# Patient Record
Sex: MALE | Race: BLACK OR AFRICAN AMERICAN | NOT HISPANIC OR LATINO | Employment: FULL TIME | URBAN - METROPOLITAN AREA
[De-identification: names, ages, dates, MRNs, and addresses within clinical notes are randomized per-mention and may not be internally consistent; named-entity substitution may affect disease eponyms.]

---

## 2024-01-02 ENCOUNTER — TELEPHONE (OUTPATIENT)
Dept: GASTROENTEROLOGY | Facility: CLINIC | Age: 54
End: 2024-01-02
Payer: OTHER GOVERNMENT

## 2024-01-02 DIAGNOSIS — K62.5 HEMORRHAGE OF ANUS AND RECTUM: Primary | ICD-10-CM

## 2024-01-02 NOTE — TELEPHONE ENCOUNTER
----- Message from Gabby Morillo LPN sent at 12/26/2023 11:08 AM CST -----  Contact: April([facility])    ----- Message -----  From: Concepcion Badillo  Sent: 12/26/2023  11:00 AM CST  To: Anastacio CHRIS Staff    April called to consult with nurse or staff regarding a referral sent in for the patient. She wanted to check on the status of the referral and would like a call back. She left a call back number of [phone] and states to ask for anyone on purple team. Thanks/MR

## 2024-01-07 ENCOUNTER — TELEPHONE (OUTPATIENT)
Dept: GASTROENTEROLOGY | Facility: CLINIC | Age: 54
End: 2024-01-07
Payer: OTHER GOVERNMENT

## 2024-01-07 NOTE — TELEPHONE ENCOUNTER
Referral reviewed. No gMed chart. Blood in stool. A1c 11. Eval for colonoscopy.    Okay to schedule next available OV with NP.  NBP

## 2024-01-08 NOTE — TELEPHONE ENCOUNTER
----- Message from Sarbjit Salcido sent at 1/8/2024  8:13 AM CST -----  Contact: pt  Pt is calling rg [facility] an [facility] Chaves/ following up on that referral being sent and can be reached at [phone]/ when will the office be scheduling again for appt/thanks/sabina

## 2024-01-09 ENCOUNTER — OFFICE VISIT (OUTPATIENT)
Dept: GASTROENTEROLOGY | Facility: CLINIC | Age: 54
End: 2024-01-09
Payer: OTHER GOVERNMENT

## 2024-01-09 VITALS
HEIGHT: 68 IN | BODY MASS INDEX: 38.49 KG/M2 | DIASTOLIC BLOOD PRESSURE: 86 MMHG | OXYGEN SATURATION: 97 % | WEIGHT: 254 LBS | HEART RATE: 83 BPM | SYSTOLIC BLOOD PRESSURE: 138 MMHG

## 2024-01-09 DIAGNOSIS — K62.5 HEMORRHAGE OF ANUS AND RECTUM: ICD-10-CM

## 2024-01-09 DIAGNOSIS — K92.1 HEMATOCHEZIA: Primary | ICD-10-CM

## 2024-01-09 DIAGNOSIS — Z12.11 SCREENING FOR COLON CANCER: ICD-10-CM

## 2024-01-09 PROCEDURE — 99203 OFFICE O/P NEW LOW 30 MIN: CPT | Mod: S$GLB,,,

## 2024-01-09 RX ORDER — POLYETHYLENE GLYCOL 3350, SODIUM SULFATE, POTASSIUM CHLORIDE, MAGNESIUM SULFATE, AND SODIUM CHLORIDE FOR ORAL SOLUTION 178.7-7.3G
1 KIT ORAL ONCE
Qty: 1 EACH | Refills: 0 | Status: SHIPPED | OUTPATIENT
Start: 2024-01-09 | End: 2024-01-09

## 2024-01-09 RX ORDER — POLYETHYLENE GLYCOL 3350, SODIUM SULFATE, POTASSIUM CHLORIDE, MAGNESIUM SULFATE, AND SODIUM CHLORIDE FOR ORAL SOLUTION 178.7-7.3G
1 KIT ORAL ONCE
Qty: 1 EACH | Refills: 0 | Status: CANCELLED | OUTPATIENT
Start: 2024-01-09 | End: 2024-01-09

## 2024-01-09 RX ORDER — ROSUVASTATIN CALCIUM 20 MG/1
20 TABLET, COATED ORAL DAILY
COMMUNITY

## 2024-01-09 RX ORDER — GLIPIZIDE 10 MG/1
5 TABLET, FILM COATED, EXTENDED RELEASE ORAL
COMMUNITY

## 2024-01-09 RX ORDER — GABAPENTIN 100 MG/1
100 CAPSULE ORAL 3 TIMES DAILY
COMMUNITY

## 2024-01-09 RX ORDER — METFORMIN HYDROCHLORIDE 500 MG/1
500 TABLET ORAL 2 TIMES DAILY WITH MEALS
COMMUNITY

## 2024-01-09 NOTE — PROGRESS NOTES
Clinic Note    Reason for visit:  The primary encounter diagnosis was Hematochezia. Diagnoses of Hemorrhage of anus and rectum and Screening for colon cancer were also pertinent to this visit.    PCP: Donna Garcia   No address on file    HPI:  This is a 53 y.o. male who is here to establish care. Patient referred due to blood in stool. Patient states for the past few months he has had intermittent BRB with wiping. No blood in the stool. No dark stools. Denies constipation, diarrhea, heartburn, or abdominal pain. Has daily easy to pass BMs. No prior colonoscopy. His brother has Crohn's disease. No FH of colon cancer.     8/31/2023: Hgb 13.8L, A1C 11.1H, Cr 1.1, CMP wnl  3/2023: B12/Folate wnl, Hgb 14, MCV 77.5L, CBC onl, Cr 1.0, glucose 478H    Review of Systems   Constitutional:  Negative for diaphoresis, fatigue, fever and unexpected weight change.   HENT:  Negative for hearing loss, mouth sores, postnasal drip, sore throat and trouble swallowing.    Eyes:  Negative for pain, discharge and eye dryness.   Respiratory:  Negative for apnea, cough, choking, chest tightness, shortness of breath and wheezing.    Cardiovascular:  Negative for chest pain, palpitations and leg swelling.   Gastrointestinal:  Positive for anal bleeding. Negative for abdominal distention, abdominal pain, blood in stool, change in bowel habit, constipation, diarrhea, nausea, rectal pain, vomiting, reflux and fecal incontinence.   Genitourinary:  Negative for bladder incontinence, dysuria and hematuria.   Musculoskeletal:  Negative for arthralgias, back pain and joint swelling.   Integumentary:  Negative for color change and rash.   Allergic/Immunologic: Negative for environmental allergies and food allergies.   Neurological:  Negative for seizures and headaches.   Hematological:  Negative for adenopathy. Does not bruise/bleed easily.        Past Medical History:   Diagnosis Date    Depression     Mixed hyperlipidemia     Neuropathy      "Obesity, Class II, BMI 35-39.9, isolated (see actual BMI)     Sleep apnea, unspecified     no CPAP use    Type 2 diabetes mellitus without complications      Past Surgical History:   Procedure Laterality Date    APPENDECTOMY      before 2000     Family History   Problem Relation Age of Onset    Crohn's disease Brother         age of onset unknown    Colon cancer Neg Hx     Esophageal cancer Neg Hx     Liver disease Neg Hx     Rectal cancer Neg Hx     Liver cancer Neg Hx     Stomach cancer Neg Hx     Ulcerative colitis Neg Hx     Pancreatic cancer Neg Hx      Social History     Tobacco Use    Smoking status: Never    Smokeless tobacco: Never   Substance Use Topics    Alcohol use: Never    Drug use: Never     Review of patient's allergies indicates:  No Known Allergies   Medication List with Changes/Refills   New Medications    SUFLAVE 178.7-7.3-0.5 GRAM SOLR    Take 1 kit by mouth once. Take according to package instructions with indicated amount of water. No breakfast day before test. for 1 dose   Current Medications    GABAPENTIN (NEURONTIN) 100 MG CAPSULE    Take 100 mg by mouth 3 (three) times daily.    GLIPIZIDE (GLUCOTROL) 10 MG TR24    Take 5 mg by mouth daily with breakfast.    METFORMIN (GLUCOPHAGE) 500 MG TABLET    Take 500 mg by mouth 2 (two) times daily with meals.    ROSUVASTATIN (CRESTOR) 20 MG TABLET    Take 20 mg by mouth once daily.         Vital Signs:  /86 (BP Location: Left arm, Patient Position: Sitting, BP Method: Large (Automatic))   Pulse 83   Ht 5' 8" (1.727 m)   Wt 115.2 kg (254 lb)   SpO2 97%   BMI 38.62 kg/m²        Physical Exam  Constitutional:       General: He is not in acute distress.     Appearance: Normal appearance. He is well-developed. He is not ill-appearing or toxic-appearing.   HENT:      Head: Normocephalic and atraumatic.      Nose: Nose normal.      Mouth/Throat:      Mouth: Mucous membranes are moist.      Pharynx: Oropharynx is clear. No oropharyngeal exudate " or posterior oropharyngeal erythema.   Eyes:      General: Lids are normal. No scleral icterus.        Right eye: No discharge.         Left eye: No discharge.      Conjunctiva/sclera: Conjunctivae normal.   Cardiovascular:      Rate and Rhythm: Normal rate and regular rhythm.      Pulses:           Radial pulses are 2+ on the right side and 2+ on the left side.   Pulmonary:      Effort: Pulmonary effort is normal. No respiratory distress.      Breath sounds: No stridor. No wheezing.   Abdominal:      General: Bowel sounds are normal. There is no distension.      Palpations: Abdomen is soft. There is no fluid wave, hepatomegaly, splenomegaly or mass.      Tenderness: There is no abdominal tenderness. There is no guarding or rebound.   Musculoskeletal:      Cervical back: Full passive range of motion without pain.   Lymphadenopathy:      Cervical: No cervical adenopathy.   Skin:     General: Skin is warm and dry.      Capillary Refill: Capillary refill takes less than 2 seconds.      Coloration: Skin is not cyanotic, jaundiced or pale.   Neurological:      General: No focal deficit present.      Mental Status: He is alert and oriented to person, place, and time.   Psychiatric:         Mood and Affect: Mood normal.         Behavior: Behavior is cooperative.            All of the data above and below has been reviewed by myself and any further interpretations will be reflected in the assessment and plan.   The data includes review of external notes, and independent interpretation of lab results, procedures, x-rays, and imaging reports.      Assessment:  Hematochezia  -     Ambulatory Referral to External Surgery    Hemorrhage of anus and rectum  -     Ambulatory referral/consult to Gastroenterology    Screening for colon cancer  -     Ambulatory Referral to External Surgery  -     SUFLAVE 178.7-7.3-0.5 gram SolR; Take 1 kit by mouth once. Take according to package instructions with indicated amount of water. No  breakfast day before test. for 1 dose  Dispense: 1 each; Refill: 0    First colonoscopy overdue. Likely outlet bleeding.      Recommendations:  Schedule colonoscopy with Dr. Chaves.     Risks, benefits, and alternatives of medical management, any associated procedures, and/or treatment discussed with the patient. Patient given opportunity to ask questions and voices understanding. Patient has elected to proceed with the recommended care modalities as discussed.    Follow up if symptoms worsen or fail to improve.    Order summary:  Orders Placed This Encounter   Procedures    Ambulatory Referral to External Surgery        Instructed patient to notify my office if they have not been contacted within two weeks after any procedures, submitting any samples (biopsies, blood, stool, urine, etc.) or after any imaging (X-ray, CT, MRI, etc.).      Ilsa Delong NP    This document may have been created using a voice recognition transcribing system. Incorrect words or phrases may have been missed during proofreading. Please interpret accordingly or contact me for clarification.

## 2024-01-09 NOTE — PATIENT INSTRUCTIONS
Schedule colonoscopy with Dr. Chaves.     Please notify my office if you have not been contacted within two weeks after any procedures, submitting any samples (biopsies, blood, stool, urine, etc.) or after any imaging (X-ray, CT, MRI, etc.).

## 2024-05-08 ENCOUNTER — TELEPHONE (OUTPATIENT)
Dept: GASTROENTEROLOGY | Facility: CLINIC | Age: 54
End: 2024-05-08
Payer: OTHER GOVERNMENT

## 2024-05-08 VITALS — HEIGHT: 68 IN | BODY MASS INDEX: 37.13 KG/M2 | WEIGHT: 245 LBS

## 2024-05-08 DIAGNOSIS — K92.1 HEMATOCHEZIA: Primary | ICD-10-CM

## 2024-05-08 DIAGNOSIS — Z12.11 SCREENING FOR COLON CANCER: ICD-10-CM

## 2024-05-08 NOTE — TELEPHONE ENCOUNTER
Called and left a detailed message that I was calling as a courtesy regarding upcoming COLON with NBP on 5/15/24, Thurs and wanted to verify that he had his paper prep instructions and meds. I also mentioned that [facility] will call the day before (WED) with the arrival time, GI Lab is located on the third floor, and to pre-register this week. herman

## 2024-05-08 NOTE — TELEPHONE ENCOUNTER
"[place] [facility]  [address] [place] [address]  Phone: [phone]  Fax: [phone]    History & Physical         Provider: Dr. Manda Chaves    Patient Name: Julio DAVENPORT (age):1970  53 y.o.           Gender: male   Phone: [phone]     Referring Physician: Donna Garcia     Vital Signs:   Height - 5' 8"  Weight - 254 lb  BMI -  37.25    Plan: Colonoscopy @ [facility]    Encounter Diagnoses   Name Primary?    Hematochezia Yes    Screening for colon cancer            History:      Past Medical History:   Diagnosis Date    Depression     Mixed hyperlipidemia     Neuropathy     Obesity, Class II, BMI 35-39.9, isolated (see actual BMI)     Sleep apnea, unspecified     no CPAP use    Type 2 diabetes mellitus without complications       Past Surgical History:   Procedure Laterality Date    APPENDECTOMY      before       Medication List with Changes/Refills   Current Medications    GABAPENTIN (NEURONTIN) 100 MG CAPSULE    Take 100 mg by mouth 3 (three) times daily.    GLIPIZIDE (GLUCOTROL) 10 MG TR24    Take 5 mg by mouth daily with breakfast.    METFORMIN (GLUCOPHAGE) 500 MG TABLET    Take 500 mg by mouth 2 (two) times daily with meals.    ROSUVASTATIN (CRESTOR) 20 MG TABLET    Take 20 mg by mouth once daily.      Review of patient's allergies indicates:  No Known Allergies   Family History   Problem Relation Name Age of Onset    Crohn's disease Brother          age of onset unknown    Colon cancer Neg Hx      Esophageal cancer Neg Hx      Liver disease Neg Hx      Rectal cancer Neg Hx      Liver cancer Neg Hx      Stomach cancer Neg Hx      Ulcerative colitis Neg Hx      Pancreatic cancer Neg Hx        Social History     Tobacco Use    Smoking status: Never    Smokeless tobacco: Never   Substance Use Topics    Alcohol use: Never    Drug use: Never        Physical Examination:     General " Appearance:___________________________  HEENT: _____________________________________  Abdomen:____________________________________  Heart:________________________________________  Lungs:_______________________________________  Extremities:___________________________________  Skin:_________________________________________  Endocrine:____________________________________  Genitourinary:_________________________________  Neurological:__________________________________      Patient has been evaluated immediately prior to sedation and is medically cleared for endoscopy with IVCS as an ASA class: ______      Physician Signature: _________________________       Date: ________  Time: ________

## 2024-05-15 ENCOUNTER — TELEPHONE (OUTPATIENT)
Dept: GASTROENTEROLOGY | Facility: CLINIC | Age: 54
End: 2024-05-15
Payer: OTHER GOVERNMENT

## 2024-05-15 RX ORDER — SODIUM, POTASSIUM,MAG SULFATES 17.5-3.13G
SOLUTION, RECONSTITUTED, ORAL ORAL
Qty: 1 KIT | Refills: 0 | Status: SHIPPED | OUTPATIENT
Start: 2024-05-15

## 2024-05-15 NOTE — TELEPHONE ENCOUNTER
Suprep sent to pharmacy. He needs to go to pharmacy ASAP to make sure they have it so we can send something else if needed before we close today.  MLC

## 2024-05-15 NOTE — TELEPHONE ENCOUNTER
----- Message from Luz Marina Saldivar sent at 5/15/2024 10:11 AM CDT -----  Contact: shey Kim is calling regarding his prep for his colonoscopy tomorrow.  Please send it to   [facility] [address] [place] [address] [address] [facility]  [address] [facility] [address]  Phone: [phone] Fax: [phone]    Please give him a call back at [phone]

## 2024-05-15 NOTE — TELEPHONE ENCOUNTER
[facility] called. Patient called them. Patient has nicholas colonoscopy roslyn and no prep medication has been called out. 5/15/24 LRA

## 2024-05-16 ENCOUNTER — OUTSIDE PLACE OF SERVICE (OUTPATIENT)
Dept: GASTROENTEROLOGY | Facility: CLINIC | Age: 54
End: 2024-05-16

## 2024-05-16 PROCEDURE — 45378 DIAGNOSTIC COLONOSCOPY: CPT | Mod: ,,, | Performed by: INTERNAL MEDICINE

## 2024-05-17 RX ORDER — POLYETHYLENE GLYCOL 3350, SODIUM SULFATE, POTASSIUM CHLORIDE, MAGNESIUM SULFATE, AND SODIUM CHLORIDE FOR ORAL SOLUTION 178.7-7.3G
1 KIT ORAL ONCE
Qty: 1 EACH | Refills: 0 | OUTPATIENT
Start: 2024-05-17 | End: 2024-05-17

## 2024-05-24 ENCOUNTER — TELEPHONE (OUTPATIENT)
Dept: GASTROENTEROLOGY | Facility: CLINIC | Age: 54
End: 2024-05-24
Payer: OTHER GOVERNMENT

## 2024-05-24 ENCOUNTER — TELEPHONE (OUTPATIENT)
Dept: ADMINISTRATIVE | Facility: CLINIC | Age: 54
End: 2024-05-24
Payer: OTHER GOVERNMENT

## 2024-05-24 DIAGNOSIS — Z12.11 SCREENING FOR COLON CANCER: ICD-10-CM

## 2024-05-24 DIAGNOSIS — K92.1 HEMATOCHEZIA: Primary | ICD-10-CM

## 2024-05-24 RX ORDER — SODIUM, POTASSIUM,MAG SULFATES 17.5-3.13G
SOLUTION, RECONSTITUTED, ORAL ORAL
Qty: 1 KIT | Refills: 0 | Status: SHIPPED | OUTPATIENT
Start: 2024-05-24

## 2024-05-24 NOTE — TELEPHONE ENCOUNTER
Patient had a large amount of stool throughout his colon during his colonoscopy.  He will need a repeat colonoscopy with aggressive prep.  Need to optimize his bowel movement regimen as well.  Recommend daily MiraLax, 1 dose a day, adjust the dose every week until he is having very soft stools but not diarrhea.  Offer nurse practitioner office visit to follow up on his bowel movement regimen.  Reschedule colonoscopy with 2 days of clear liquids and MiraLax 3 times a day the week prior with the routine colonoscopy prep.  Orders signed.    Judi, confirm we have updated PAs.  NBP

## 2024-05-24 NOTE — TELEPHONE ENCOUNTER
Message  Received: Today   Pt Advice  Luz Marina Saldivar Staff  Caller: shey (Today,  9:05 AM)  Type:  Patient Returning Call    Who Called:shey  Who Left Message for Patient:Vielka Amato MA  Does the patient know what this is regarding?:unknown  Would the patient rather a call back or a response via [facility]? Call back  Best Call Back Number:[phone]  Additional Information:

## 2024-05-24 NOTE — TELEPHONE ENCOUNTER
Patient notified of what NBP said and voiced understanding. Procedure and appointment was scheduled based on patient's work schedule. Colonoscopy scheduled on Wednesday October 16, 2024 at [facility] w/ NBP. Order faxed to [facility]. OV scheduled w/ [facility] on Monday September 30, 2024 at 2:00 pm. DMP

## 2024-08-01 ENCOUNTER — TELEPHONE (OUTPATIENT)
Dept: GASTROENTEROLOGY | Facility: CLINIC | Age: 54
End: 2024-08-01
Payer: OTHER GOVERNMENT

## 2024-08-01 NOTE — TELEPHONE ENCOUNTER
[7/24 9:36 AM] Judi Baez  MRN [id number] Justino Approved REF# [id number] (10/16)      Faxed to central scheduling w/approval

## 2024-09-30 ENCOUNTER — OFFICE VISIT (OUTPATIENT)
Dept: GASTROENTEROLOGY | Facility: CLINIC | Age: 54
End: 2024-09-30
Payer: OTHER GOVERNMENT

## 2024-09-30 VITALS
SYSTOLIC BLOOD PRESSURE: 119 MMHG | WEIGHT: 249 LBS | HEIGHT: 68 IN | HEART RATE: 94 BPM | OXYGEN SATURATION: 95 % | DIASTOLIC BLOOD PRESSURE: 85 MMHG | BODY MASS INDEX: 37.74 KG/M2

## 2024-09-30 DIAGNOSIS — Z12.11 SCREENING FOR COLON CANCER: ICD-10-CM

## 2024-09-30 DIAGNOSIS — K59.00 CONSTIPATION, UNSPECIFIED CONSTIPATION TYPE: Primary | ICD-10-CM

## 2024-09-30 DIAGNOSIS — K92.1 HEMATOCHEZIA: ICD-10-CM

## 2024-09-30 PROCEDURE — 99213 OFFICE O/P EST LOW 20 MIN: CPT | Mod: S$GLB,,,

## 2024-09-30 NOTE — LETTER
September 30, 2024        Donna Garcia, APRN  [address]  [address]  [place] [address]             [place] [facility]  [address] [place] [address]  Phone: [phone]  Fax: [phone]   Patient: Julio Badillo   MR Number: [id number]   YOB: 1970   Date of Visit: 9/30/2024       Dear Dr. Garcia:    Thank you for referring Julio Badillo to me for evaluation. Attached you will find relevant portions of my assessment and plan of care.    If you have questions, please do not hesitate to call me. I look forward to following Julio Badillo along with you.    Sincerely,      Ilsa Delong, NP            CC  No Recipients    Enclosure

## 2024-09-30 NOTE — PATIENT INSTRUCTIONS
Proceed with colonoscopy with Dr. Chaves on 10/16/2024. You will do 2 days of clear liquids instead of just the day before procedure and MiraLAX 3 times a day the week prior to procedure.       Please notify my office if you have not been contacted within two weeks after any procedures, submitting any samples (biopsies, blood, stool, urine, etc.) or after any imaging (X-ray, CT, MRI, etc.).

## 2024-09-30 NOTE — PROGRESS NOTES
Clinic Note    Reason for visit:  The primary encounter diagnosis was Constipation, unspecified constipation type. Diagnoses of Hematochezia and Screening for colon cancer were also pertinent to this visit.    PCP: Donna Garcia       HPI:  This is a 53 y.o. male who is here for a follow up. Patient with intermittent BRBPR with wiping. He had first colonoscopy with Dr. Chaves with very poor prep and was to repeat colonoscopy w/aggressive prep after finding good BM regimen. Dr. Chaves recommended he start taking MiraLAX. Scheduled for colonoscopy 10/16/2024. Denies constipation. Still has some BRB with wiping.     His brother has Crohn's disease. No FH of colon cancer.     Colonoscopy 5/16/2024: Very large amount of solid stool throughout colon. EH. Repeat colonoscopy w/aggressive prep after establishing good BM regimen.    2/2024: A1C 12.6H  12/2023: A1C 11.1H  8/31/2023: Hgb 13.8L, A1C 11.1H, Cr 1.1, CMP wnl  3/2023: B12/Folate wnl, Hgb 14, MCV 77.5L, CBC onl, Cr 1.0, glucose 478    Review of Systems   Constitutional:  Negative for diaphoresis, fatigue, fever and unexpected weight change.   HENT:  Negative for hearing loss, mouth sores, postnasal drip, sore throat and trouble swallowing.    Eyes:  Negative for pain, discharge and eye dryness.   Respiratory:  Negative for apnea, cough, choking, chest tightness, shortness of breath and wheezing.    Cardiovascular:  Negative for chest pain, palpitations and leg swelling.   Gastrointestinal:  Negative for abdominal distention, abdominal pain, anal bleeding, blood in stool, change in bowel habit, constipation, diarrhea, nausea, rectal pain, vomiting, reflux and fecal incontinence.   Genitourinary:  Negative for bladder incontinence, dysuria and hematuria.   Musculoskeletal:  Negative for arthralgias, back pain and joint swelling.   Integumentary:  Negative for color change and rash.   Allergic/Immunologic: Negative for environmental allergies and food allergies.  "  Neurological:  Negative for seizures and headaches.   Hematological:  Negative for adenopathy. Does not bruise/bleed easily.        Past Medical History:   Diagnosis Date    Depression     Mixed hyperlipidemia     Neuropathy     Obesity, Class II, BMI 35-39.9, isolated (see actual BMI)     Sleep apnea, unspecified     no CPAP use    Type 2 diabetes mellitus without complications      Past Surgical History:   Procedure Laterality Date    APPENDECTOMY      before 2000     Family History   Problem Relation Name Age of Onset    Crohn's disease Brother          age of onset unknown    Colon cancer Neg Hx      Esophageal cancer Neg Hx      Liver disease Neg Hx      Rectal cancer Neg Hx      Liver cancer Neg Hx      Stomach cancer Neg Hx      Ulcerative colitis Neg Hx      Pancreatic cancer Neg Hx       Social History     Tobacco Use    Smoking status: Never    Smokeless tobacco: Never   Substance Use Topics    Alcohol use: Never    Drug use: Never     Review of patient's allergies indicates:  No Known Allergies   Medication List with Changes/Refills   Current Medications    GABAPENTIN (NEURONTIN) 100 MG CAPSULE    Take 100 mg by mouth 3 (three) times daily.    GLIPIZIDE (GLUCOTROL) 10 MG TR24    Take 5 mg by mouth daily with breakfast.    METFORMIN (GLUCOPHAGE) 500 MG TABLET    Take 500 mg by mouth 2 (two) times daily with meals.   Discontinued Medications    ROSUVASTATIN (CRESTOR) 20 MG TABLET    Take 20 mg by mouth once daily.    SODIUM,POTASSIUM,MAG SULFATES (SUPREP BOWEL PREP KIT) 17.5-3.13-1.6 GRAM SOLR    Take according to kit instructions but DO NOT eat breakfast the morning of procedure.    SODIUM,POTASSIUM,MAG SULFATES (SUPREP BOWEL PREP KIT) 17.5-3.13-1.6 GRAM SOLR    Take according to kit instructions but DO NOT eat breakfast the morning of procedure.         Vital Signs:  /85 (BP Location: Left arm, Patient Position: Sitting)   Pulse 94   Ht 5' 8" (1.727 m)   Wt 112.9 kg (249 lb)   SpO2 95%   BMI " 37.86 kg/m²        Physical Exam  Constitutional:       General: He is not in acute distress.     Appearance: Normal appearance. He is well-developed. He is not ill-appearing or toxic-appearing.   HENT:      Head: Normocephalic and atraumatic.      Nose: Nose normal.      Mouth/Throat:      Mouth: Mucous membranes are moist.      Pharynx: Oropharynx is clear. No oropharyngeal exudate or posterior oropharyngeal erythema.   Eyes:      General: Lids are normal. No scleral icterus.        Right eye: No discharge.         Left eye: No discharge.      Conjunctiva/sclera: Conjunctivae normal.   Cardiovascular:      Rate and Rhythm: Normal rate and regular rhythm.      Pulses:           Radial pulses are 2+ on the right side and 2+ on the left side.   Pulmonary:      Effort: Pulmonary effort is normal. No respiratory distress.      Breath sounds: No stridor. No wheezing.   Abdominal:      General: Bowel sounds are normal. There is no distension.      Palpations: Abdomen is soft. There is no fluid wave, hepatomegaly, splenomegaly or mass.      Tenderness: There is no abdominal tenderness. There is no guarding or rebound.   Musculoskeletal:      Cervical back: Full passive range of motion without pain.   Lymphadenopathy:      Cervical: No cervical adenopathy.   Skin:     General: Skin is warm and dry.      Capillary Refill: Capillary refill takes less than 2 seconds.      Coloration: Skin is not cyanotic, jaundiced or pale.   Neurological:      General: No focal deficit present.      Mental Status: He is alert and oriented to person, place, and time.   Psychiatric:         Mood and Affect: Mood normal.         Behavior: Behavior is cooperative.            All of the data above and below has been reviewed by myself and any further interpretations will be reflected in the assessment and plan.   The data includes review of external notes, and independent interpretation of lab results, procedures, x-rays, and imaging reports.       Assessment:  Constipation, unspecified constipation type    Hematochezia    Screening for colon cancer    5/16/2024 he had colonoscopy w/solid stool throughout colon. He had external hemorrhoids. Scheduled for repeat colon and will need aggressive prep. Has prep.  May need to be referred to endocrinology for diabetes. A1C 12.6 in 2/2024.     Recommendations:  Proceed with colonoscopy with Dr. Chaves on 10/16/2024. You will do 2 days of clear liquids instead of just the day before procedure and MiraLAX 3 times a day the week prior to procedure.    Risks, benefits, and alternatives of medical management, any associated procedures, and/or treatment discussed with the patient. Patient given opportunity to ask questions and voices understanding. Patient has elected to proceed with the recommended care modalities as discussed.    Follow up if symptoms worsen or fail to improve.    Order summary:  No orders of the defined types were placed in this encounter.       Instructed patient to notify my office if they have not been contacted within two weeks after any procedures, submitting any samples (biopsies, blood, stool, urine, etc.) or after any imaging (X-ray, CT, MRI, etc.).      Ilsa Delong NP    This document may have been created using a voice recognition transcribing system. Incorrect words or phrases may have been missed during proofreading. Please interpret accordingly or contact me for clarification.

## 2024-10-08 ENCOUNTER — TELEPHONE (OUTPATIENT)
Dept: GASTROENTEROLOGY | Facility: CLINIC | Age: 54
End: 2024-10-08
Payer: OTHER GOVERNMENT

## 2024-10-08 NOTE — TELEPHONE ENCOUNTER
S/w pt and told him that I was calling as a courtesy regarding up coming Colon with NBP on 10/16/24, Wed and wanted to verify that he has his paper prep instructions and meds. Pt  stated he has both. I also mentioned that [facility] will call the day before (TUES) with the arrival time, GI Lab is located on the third floor, and to pre-register before next Tues. herman